# Patient Record
Sex: FEMALE | Race: WHITE
[De-identification: names, ages, dates, MRNs, and addresses within clinical notes are randomized per-mention and may not be internally consistent; named-entity substitution may affect disease eponyms.]

---

## 2017-04-07 ENCOUNTER — HOSPITAL ENCOUNTER (EMERGENCY)
Dept: HOSPITAL 31 - C.ER | Age: 22
Discharge: HOME | End: 2017-04-07
Payer: COMMERCIAL

## 2017-04-07 VITALS
SYSTOLIC BLOOD PRESSURE: 103 MMHG | DIASTOLIC BLOOD PRESSURE: 64 MMHG | HEART RATE: 64 BPM | OXYGEN SATURATION: 100 % | TEMPERATURE: 98.3 F | RESPIRATION RATE: 20 BRPM

## 2017-04-07 VITALS — BODY MASS INDEX: 21.4 KG/M2

## 2017-04-07 DIAGNOSIS — N89.8: Primary | ICD-10-CM

## 2017-04-07 LAB
BACTERIA #/AREA URNS HPF: (no result) /[HPF]
BILIRUB UR-MCNC: NEGATIVE MG/DL
GLUCOSE UR STRIP-MCNC: NORMAL MG/DL
KETONES UR STRIP-MCNC: NEGATIVE MG/DL
LEUKOCYTE ESTERASE UR-ACNC: (no result) LEU/UL
PH UR STRIP: 5 [PH] (ref 5–8)
PROT UR STRIP-MCNC: NEGATIVE MG/DL
RBC # UR STRIP: NEGATIVE /UL
RBC #/AREA URNS HPF: 1 /HPF (ref 0–3)
SP GR UR STRIP: 1.03 (ref 1–1.03)
UROBILINOGEN UR-MCNC: NORMAL MG/DL (ref 0.2–1)
WBC #/AREA URNS HPF: 13 /HPF (ref 0–5)

## 2017-04-07 PROCEDURE — 99284 EMERGENCY DEPT VISIT MOD MDM: CPT

## 2017-04-07 PROCEDURE — 96372 THER/PROPH/DIAG INJ SC/IM: CPT

## 2017-04-07 PROCEDURE — 87086 URINE CULTURE/COLONY COUNT: CPT

## 2017-04-07 PROCEDURE — 81001 URINALYSIS AUTO W/SCOPE: CPT

## 2017-04-07 PROCEDURE — 84703 CHORIONIC GONADOTROPIN ASSAY: CPT

## 2017-04-07 NOTE — C.PDOC
History Of Present Illness


Patient presents to ED c/o suprapubic/pelvic pain, and vaginal discharge x 2 

days.  Patient states she is s/p spontaneous miscarriage Jan 2017 due to an 

"infection" - specifics unknown.  She called her ob/gyn Dr. Sequeira today, was 

told he doesn't take her insurance and she should come to ED for evaluation.  

She admits to dyspareunia as well.  Patient denies dysuria/hematuria, fever, 

vomiting/diarrhea. 


Time Seen by Provider: 04/07/17 15:22


Chief Complaint (Nursing): Female Genitourinary


History Per: Patient


History/Exam Limitations: no limitations


Severity: Mild


Quality Of Discomfort: "Pain"


Abnormal Vaginal Bleeding: No





Past Medical History


Reviewed: Historical Data, Nursing Documentation, Vital Signs


Vital Signs: 


 Last Vital Signs











Temp  98.3 F   04/07/17 15:15


 


Pulse  64   04/07/17 15:15


 


Resp  20   04/07/17 15:15


 


BP  103/64   04/07/17 15:15


 


Pulse Ox  100   04/07/17 16:29














- Medical History


PMH: Anemia (IRON DEFICIENCY)





- [facility] Procedures








REPAIR OB LACERATION NEC (03/10/15)








Family History: States: No Known Family Hx





- Social History


Hx Tobacco Use: No


Hx Alcohol Use: No


Hx Substance Use: No





Review Of Systems


Except As Marked, All Systems Reviewed And Found Negative.


Constitutional: Negative for: Fever, Chills


Respiratory: Negative for: Cough, Shortness of Breath


Gastrointestinal: Negative for: Nausea, Vomiting, Abdominal Pain


Genitourinary: Positive for: Vaginal Discharge, Pelvic Pain.  Negative for: 

Dysuria, Hematuria, Vaginal Bleeding


Skin: Negative for: Rash





Physical Exam





- Physical Exam


Appears: Well, Non-toxic, No Acute Distress


Head: Normacephalic


Eye(s): bilateral: Normal Inspection


Oral Mucosa: Moist


Cardiovascular: Rhythm Regular


Respiratory: Normal Breath Sounds, No Rales, No Rhonchi, No Wheezing


Gastrointestinal/Abdominal: Bowel Sounds, Soft, Tenderness (mild suprapubic TTP)

, No Distention, No Guarding, No Rebound


Pelvic: No Normal External Exam (labia irritated and erythematous appearing), 

No Normal Speculum Exam (patient refused), No Normal Bimanual Exam (refused)


Neurological/Psych: Oriented x3





ED Course And Treatment


O2 Sat by Pulse Oximetry: 100 (RA)


Pulse Ox Interpretation: Normal


Progress Note: Urinalysis, Upreg ordered.  Patient taken to [facility] for pelvic exam

, then refused to have internal exam (speculum/bimanual).  Explained to patient 

that without exam, I am unable to specifically diagnose her problem.  History 

and external exam suspicious for STD vs candidiasis.  Patient given IM Rocephin 

and PO Azithromycin in ED.  She was given Rxs for Diflucan and Flagyl as well, 

and instructed to follow up with ob/gyn in 1-2 days.  She understands she 

should return to ED if symptoms worsen.





Disposition


Counseled Patient/Family Regarding: Diagnosis, Need For Followup, Rx Given





- Disposition


Referrals: 


[facility] [Outside]


[facility] [Outside]


Marquise Sequeira [Staff Provider] - 


Disposition: HOME/ ROUTINE


Disposition Time: 17:00


Condition: STABLE


Additional Instructions: 


FOLLOW UP WITH YOUR OB/GYN/CLINIC IN 1-2 DAYS





USE MEDICATIONS AS DIRECTED





RETURN TO ER IF SYMPTOMS WORSEN


Prescriptions: 


Fluconazole [Diflucan] 150 mg PO ONCE #2 tab


metroNIDAZOLE [Flagyl] 500 mg PO BID #14 tab


Instructions:  Vaginal Discharge (ED)


Print Language: ENGLISH





- POA


Present On Arrival: None





- Clinical Impression


Clinical Impression: 


 Vaginal discharge

## 2018-10-16 ENCOUNTER — HOSPITAL ENCOUNTER (EMERGENCY)
Dept: HOSPITAL 31 - C.ER | Age: 23
Discharge: HOME | End: 2018-10-16
Payer: COMMERCIAL

## 2018-10-16 VITALS
DIASTOLIC BLOOD PRESSURE: 72 MMHG | TEMPERATURE: 98.1 F | HEART RATE: 64 BPM | RESPIRATION RATE: 16 BRPM | SYSTOLIC BLOOD PRESSURE: 113 MMHG

## 2018-10-16 VITALS — BODY MASS INDEX: 21.4 KG/M2

## 2018-10-16 VITALS — OXYGEN SATURATION: 99 %

## 2018-10-16 DIAGNOSIS — O23.41: Primary | ICD-10-CM

## 2018-10-16 DIAGNOSIS — K59.00: ICD-10-CM

## 2018-10-16 DIAGNOSIS — Z3A.08: ICD-10-CM

## 2018-10-16 LAB
ALBUMIN SERPL-MCNC: 4.6 G/DL (ref 3.5–5)
ALBUMIN/GLOB SERPL: 1.4 {RATIO} (ref 1–2.1)
ALT SERPL-CCNC: 22 U/L (ref 9–52)
AST SERPL-CCNC: 20 U/L (ref 14–36)
BACTERIA #/AREA URNS HPF: (no result) /[HPF]
BASOPHILS # BLD AUTO: 0.1 K/UL (ref 0–0.2)
BASOPHILS NFR BLD: 1.1 % (ref 0–2)
BILIRUB UR-MCNC: NEGATIVE MG/DL
BUN SERPL-MCNC: 9 MG/DL (ref 7–17)
CALCIUM SERPL-MCNC: 10.1 MG/DL (ref 8.6–10.4)
EOSINOPHIL # BLD AUTO: 0 K/UL (ref 0–0.7)
EOSINOPHIL NFR BLD: 0.6 % (ref 0–4)
ERYTHROCYTE [DISTWIDTH] IN BLOOD BY AUTOMATED COUNT: 14.4 % (ref 11.5–14.5)
GFR NON-AFRICAN AMERICAN: > 60
GLUCOSE UR STRIP-MCNC: NORMAL MG/DL
HCG,QUALITATIVE URINE: POSITIVE
HGB BLD-MCNC: 12.3 G/DL (ref 11–16)
LEUKOCYTE ESTERASE UR-ACNC: (no result) LEU/UL
LYMPHOCYTES # BLD AUTO: 1.9 K/UL (ref 1–4.3)
LYMPHOCYTES NFR BLD AUTO: 25.6 % (ref 20–40)
MCH RBC QN AUTO: 24.9 PG (ref 27–31)
MCHC RBC AUTO-ENTMCNC: 33.1 G/DL (ref 33–37)
MCV RBC AUTO: 75.2 FL (ref 81–99)
MONOCYTES # BLD: 0.5 K/UL (ref 0–0.8)
MONOCYTES NFR BLD: 6.6 % (ref 0–10)
NEUTROPHILS # BLD: 5 K/UL (ref 1.8–7)
NEUTROPHILS NFR BLD AUTO: 66.1 % (ref 50–75)
NRBC BLD AUTO-RTO: 0 % (ref 0–2)
PH UR STRIP: 7 [PH] (ref 5–8)
PLATELET # BLD: 273 K/UL (ref 130–400)
PMV BLD AUTO: 9.2 FL (ref 7.2–11.7)
PROT UR STRIP-MCNC: NEGATIVE MG/DL
RBC # BLD AUTO: 4.94 MIL/UL (ref 3.8–5.2)
RBC # UR STRIP: NEGATIVE /UL
SP GR UR STRIP: 1 (ref 1–1.03)
SQUAMOUS EPITHIAL: 4 /HPF (ref 0–5)
UROBILINOGEN UR-MCNC: NORMAL MG/DL (ref 0.2–1)
WBC # BLD AUTO: 7.5 K/UL (ref 4.8–10.8)

## 2018-10-16 NOTE — US
Date of service: 



10/16/2018



PROCEDURE:  OB Pelvic Ultrasound



HISTORY:

pain



LMP:       



COMPARISON:

None available.



FINDINGS:



UTERUS:

Gestational sac: 27 mm diameter equivalent to 7 weeks 4 days 

gestational age



Crown-rump length 11 mm equivalent to 7 weeks 1 day gestational age.



Fetal Heart rate: 163 bpm.



Fetal age (Ultrasound estimated): 7 weeks 3 days



Juliette-gestational hemorrhage: None.



Date of delivery (Ultrasound estimated) : 



Uterus measures 10.6 x 6.4 x 7.1 cm.  Uterus retroverted.  No uterine 

mass.. 



CERVIX:

Measures 3.9 cm. Long and closed. No cervical abnormality seen.



RIGHT OVARY:

Measures 3.8 x 2.5 x 2.4 cm.  Corpus luteum identified measuring 2.2 

x 2.3 x 1.7 cm..  Normal flow. 



LEFT OVARY:

Measures 3.4 x 1.5 x 2.7 cm. No solid mass. Normal flow. 



FREE FLUID:

Small amount in cul-de-sac and right adnexal region



OTHER FINDINGS:

None. 



IMPRESSION:

Single live intrauterine gestation of approximately 7 weeks 3 days 

gestational age.  No subchorionic hemorrhage.  Fetal heart rate 163 

beats per minute.  Incidentally noted right ovarian corpus luteum.  

Small amount of free fluid incidentally noted in cul-de-sac and right 

adnexa.

## 2018-10-16 NOTE — C.PDOC
History Of Present Illness


23 year old female, , who is 8 weeks pregnant; presents to the ED for 

evaluation of lower abdominal pain associated with dysuria and urinary frequency

for one week. Patient also reports constipation and feeling bloated. She had a 

bowel movement today, but states she had to strain and the stool was hard. She 

also reports back pain. Patient denies fever, n/v, chest pain, sob, vaginal 

bleeding or discharge. 


Time Seen by Provider: 10/16/18 13:24


Chief Complaint (Nursing): Abdominal Pain


History Per: Patient


History/Exam Limitations: no limitations


Onset/Duration Of Symptoms: Days


Current Symptoms Are (Timing): Still Present


Location Of Pain/Discomfort: Other (lower abdomen )


Radiation Of Pain To:: Back


Quality Of Discomfort: "Pain"


Associated Symptoms: Constipation, Urinary Symptoms (dysuria ).  denies: Fever, 

Chills


Last Bowel Movement: Today


Additional History Per: Patient


Abnormal Vaginal Bleeding: No





Past Medical History


Reviewed: Historical Data, Nursing Documentation, Vital Signs


Vital Signs: 





                                Last Vital Signs











Temp  98.5 F   10/16/18 13:21


 


Pulse  70   10/16/18 13:21


 


Resp  18   10/16/18 13:21


 


BP  101/68   10/16/18 13:21


 


Pulse Ox  99   10/16/18 13:21














- Medical History


PMH: Anemia (IRON DEFICIENCY)


   Denies: Chronic Kidney Disease


Surgical History: No Surg Hx





- [facility] Procedures











REPAIR OB LACERATION NEC (03/10/15)








Family History: States: Unknown Family Hx





- Social History


Hx Tobacco Use: No


Hx Alcohol Use: No


Hx Substance Use: No





- Immunization History


Hx Tetanus Toxoid Vaccination: No


Hx Influenza Vaccination: No





Review Of Systems


Except As Marked, All Systems Reviewed And Found Negative.


Constitutional: Negative for: Chills


Gastrointestinal: Positive for: Abdominal Pain (lower abdomen ), Constipation


Genitourinary: Positive for: Dysuria.  Negative for: Vaginal Discharge, Vaginal 

Bleeding


Musculoskeletal: Positive for: Back Pain





Physical Exam





- Physical Exam


Appears: Non-toxic, No Acute Distress


Skin: Normal Color, Warm, Dry


Head: Atraumatic, Normacephalic


Eye(s): bilateral: Normal Inspection, EOMI


Nose: Normal


Oral Mucosa: Moist


Neck: Normal ROM, Supple


Chest: Symmetrical, No Deformity, No Tenderness


Cardiovascular: Rhythm Regular


Respiratory: Normal Breath Sounds, No Rales, No Rhonchi, No Wheezing


Gastrointestinal/Abdominal: Soft, Tenderness (suprapubic), No Guarding, No 

Rebound


Back: No CVA Tenderness, No Vertebral Tenderness, Paraspinal Tenderness (lower 

paralumbar tenderness)


Pelvic: Other (declined )


Extremity: Normal ROM, Capillary Refill (less than 2 seconds )


Neurological/Psych: Oriented x3, Normal Speech, Normal Cognition





ED Course And Treatment





- Laboratory Results


Result Diagrams: 


                                 10/16/18 15:27





                                 10/16/18 15:27


O2 Sat by Pulse Oximetry: 99 (on RA)


Pulse Ox Interpretation: Normal





- CT Scan/US


  ** OB ultrasound


Other Rad Studies (CT/US): Read By Radiologist, Radiology Report Reviewed


CT/US Interpretation: Date of service:  10/16/2018.  PROCEDURE:  OB Pelvic 

Ultrasound.  HISTORY:  pain.  LMP:  COMPARISON:  None available.  FINDINGS:  

UTERUS:  Gestational sac: 27 mm diameter equivalent to 7 weeks 4 days 

gestational age.  Crown-rump length 11 mm equivalent to 7 weeks 1 day 

gestational age.  Fetal Heart rate: 163 bpm.  Fetal age (Ultrasound estimated): 

7 weeks 3 days.  Juliette-gestational hemorrhage: None.  Date of delivery 

(Ultrasound estimated) :  Uterus measures 10.6 x 6.4 x 7.1 cm.  Uterus 

retroverted.  No uterine mass..  CERVIX:  Measures 3.9 cm. Long and closed. No 

cervical abnormality seen.  RIGHT OVARY:  Measures 3.8 x 2.5 x 2.4 cm.  Corpus 

luteum identified measuring 2.2 x 2.3 x 1.7 cm..  Normal flow.  LEFT OVARY:  

Measures 3.4 x 1.5 x 2.7 cm. No solid mass. Normal flow.  FREE FLUID:  Small 

amount in cul-de-sac and right adnexal region.  OTHER FINDINGS:  None.  

IMPRESSION:  Single live intrauterine gestation of approximately 7 weeks 3 days 

gestational age.  No subchorionic hemorrhage.  Fetal heart rate 163 beats per 

minute.  Incidentally noted right ovarian corpus luteum.  Small amount of free 

fluid incidentally noted in cul-de-sac and right adnexa.


Progress Note: Pt is has clinical symptoms of UTI, with leuk est 3+ therefore 

will be treated for UTI.  Pt was given work up results and instructed to follow 

up with her OB in 1-2 days for re-evaluation. Case discussed with Dr Pearl who 

evlauted work up and agreed upon plan and treatment.





Disposition





- Disposition


Disposition: HOME/ ROUTINE


Disposition Time: 15:35


Condition: STABLE


Additional Instructions: 


Follow up with your primary medical doctor or clinic in 2-5 days for further 

evaluation. Take medications as prescribed. Return to the emergency department 

at any time if symptoms persist or worsen.


Prescriptions: 


Nitrofurantoin Macrocrystals [Macrobid] 1 cap PO BID #10 cap


Polyethylene Glycol 3350 [Miralax] 17 gm PO DAILY #85 gm


Instructions:  Constipation, Adult (DC)


Forms:  [facility] (English)





- Clinical Impression


Clinical Impression: 


 Constipation, UTI (urinary tract infection)








- PA / NP / Resident Statement


MD/DO has reviewed & agrees with the documentation as recorded.





- Scribe Statement


The provider has reviewed the documentation as recorded by the Scribe (Zahira Neal)








All medical record entries made by the Scribe were at my direction and 

personally dictated by me. I have reviewed the chart and agree that the record 

accurately reflects my personal performance of the history, physical exam, 

medical decision making, and the department course for this patient. I have also

 personally directed, reviewed, and agree with the discharge instructions and 

disposition.